# Patient Record
Sex: FEMALE | Race: WHITE | Employment: FULL TIME | URBAN - METROPOLITAN AREA
[De-identification: names, ages, dates, MRNs, and addresses within clinical notes are randomized per-mention and may not be internally consistent; named-entity substitution may affect disease eponyms.]

---

## 2019-11-07 ENCOUNTER — OFFICE VISIT (OUTPATIENT)
Dept: NEUROLOGY | Age: 64
End: 2019-11-07

## 2019-11-07 VITALS
HEIGHT: 64 IN | RESPIRATION RATE: 20 BRPM | TEMPERATURE: 97.9 F | WEIGHT: 158 LBS | BODY MASS INDEX: 26.98 KG/M2 | SYSTOLIC BLOOD PRESSURE: 124 MMHG | OXYGEN SATURATION: 96 % | HEART RATE: 78 BPM | DIASTOLIC BLOOD PRESSURE: 84 MMHG

## 2019-11-07 DIAGNOSIS — G50.0 TRIGEMINAL NEURALGIA: Primary | ICD-10-CM

## 2019-11-07 RX ORDER — AMLODIPINE BESYLATE 10 MG/1
TABLET ORAL
COMMUNITY
Start: 2019-09-20

## 2019-11-07 RX ORDER — GABAPENTIN 300 MG/1
CAPSULE ORAL
COMMUNITY
Start: 2019-09-20 | End: 2019-12-06 | Stop reason: ALTCHOICE

## 2019-11-07 RX ORDER — CARBAMAZEPINE 200 MG/1
200 TABLET, EXTENDED RELEASE ORAL 2 TIMES DAILY
Qty: 60 TAB | Refills: 5 | Status: SHIPPED | OUTPATIENT
Start: 2019-11-07 | End: 2019-12-06 | Stop reason: SINTOL

## 2019-11-07 RX ORDER — CELECOXIB 100 MG/1
CAPSULE ORAL
COMMUNITY
Start: 2019-08-15

## 2019-11-07 NOTE — PROGRESS NOTES
Alyssa Wolff is a 62 y.o., right handed female, with an established history of hypertension, who comes in with a 5 month history of left sided facial pain. She thought it was related to migraines that she had until she went through menopause at which time her headaches resolved. She now has a lancinating, lightening bolt type of pain in the left side of her face. It's centered in the jaw and around the ear. Also a slight bit at the edge of there nose. It's especially bad at night. She's also noted pain in the teeth intermittently especially at night. No problems on the right side. Neurontin had helped, but it's effectiveness has worn off. Brushing her teeth can trigger it. No weakness of the face or extremities noted. Social History; she's , lives with her . Doesn't smoke, occasional glass of wine. No illicit drugs. Works doing reception work. Family History; mother  from heart attack, had diabetes and hypertension. Father  from lung cancer, had diabetes. Siblings with diabetes. Current Outpatient Medications   Medication Sig Dispense Refill    gabapentin (NEURONTIN) 300 mg capsule       amLODIPine (NORVASC) 10 mg tablet       celecoxib (CELEBREX) 100 mg capsule          No past medical history on file. No past surgical history on file. Allergies   Allergen Reactions    Ondansetron Hcl Nausea Only    Oxycodone-Acetaminophen Other (comments)     Severe headache    Lisinopril Cough       There is no problem list on file for this patient.         Review of Systems:   As above otherwise 11 point review of systems negative including;   Constitutional no fever or chills  Skin denies rash or itching  HENT  Denies tinnitus, hearing lose  Eyes denies diplopia vision lose  Respiratory denies shortness of breath  Cardiovascular denies chest pain, dyspnea on exertion  Gastrointestinal denies nausea, vomiting, diarrhea, constipation  Genitourinary denies incontinence  Musculoskeletal denies joint pain or swelling  Endocrine denies weight change  Hematology denies easy bruising or bleeding   Neurological as above in HPI      PHYSICAL EXAMINATION:      VITAL SIGNS:    Visit Vitals  /84 (BP 1 Location: Left arm, BP Patient Position: Sitting)   Pulse 78   Temp 97.9 °F (36.6 °C) (Oral)   Resp 20   Ht 5' 4\" (1.626 m)   Wt 71.7 kg (158 lb)   LMP  (LMP Unknown)   SpO2 96%   BMI 27.12 kg/m²       GENERAL: The patient is well developed, well nourished, and in no apparent distress. EXTREMITIES: No clubbing, cyanosis, or edema is identified. Pulses 2+ and symmetrical.  Muscle tone is normal.  HEAD:   Ear, nose, and throat appear to be without trauma. The patient is normocephalic. NEUROLOGIC EXAMINATION    MENTAL STATUS: The patient is awake, alert, and oriented x 4. Fund of knowledge is adequate. Speech is fluent and memory appears to be intact, both long and short term. CRANIAL NERVES: II - Visual fields are full to confrontation. Funduscopic examination reveals flat disks bilaterally. Pupils are both 4 mm and briskly reactive to light and accommodation. III, IV, VI - Extraocular movements are intact and there is no nystagmus. V - Facial sensation is intact to pinprick and light touch. VII - Face is symmetrical.   VIII - Hearing is present. IX, X, XII- Palate rises symmetrically. Gag is present. Tongue is in the midline. XI - Shoulder shrugging and head turning intact  MOTOR:  The patient is 5/5 in all four limbs without any drift. Fine finger movements are symmetrical.  Isolated motor group testing reveals no focal abnormalities. Tone is normal.  Sensory examination is intact to pinprick, light touch and position sense testing. Reflexes are 2+ and symmetrical. Plantars are down going. Cerebellar examination reveals no gross ataxia or dysmetria. Gait is normal and the patient can tandem walk without any difficulty.       CBC: No results found for: WBC, RBC, HGB, HCT, PLT, HGBEXT, HCTEXT, PLTEXT  BMP: No results found for: GLU, NA, K, CL, CO2, BUN, CREA, CA  CMP: No results found for: GLU, NA, K, CL, CO2, BUN, CREA, CA, AGAP, BUCR, TBIL, GPT, AP, TP, ALB, GLOB, AGRAT  Coagulation: No results found for: PTP, INR, APTT, PTTT, INREXT  Cardiac markers: No results found for: CPK, CKND1, BHAVYA       Impression: Pretty classic trigeminal neuralgia of the left face in this patient who has risk factors including none. She has a normal examination. Plan: At this juncture we will switch her from a Neurontin and try some Tegretol which I find to be very effective for this condition. Reassured her and we will see her back here in a couple of weeks. PLEASE NOTE:   This document has been produced using voice recognition software. Unrecognized errors in transcription may be present.

## 2019-12-06 ENCOUNTER — OFFICE VISIT (OUTPATIENT)
Dept: NEUROLOGY | Age: 64
End: 2019-12-06

## 2019-12-06 VITALS
OXYGEN SATURATION: 95 % | DIASTOLIC BLOOD PRESSURE: 88 MMHG | WEIGHT: 157.2 LBS | HEIGHT: 64 IN | TEMPERATURE: 97.7 F | HEART RATE: 80 BPM | SYSTOLIC BLOOD PRESSURE: 142 MMHG | BODY MASS INDEX: 26.84 KG/M2 | RESPIRATION RATE: 18 BRPM

## 2019-12-06 DIAGNOSIS — G50.0 TRIGEMINAL NEURALGIA: Primary | ICD-10-CM

## 2019-12-06 RX ORDER — OXCARBAZEPINE 150 MG/1
150 TABLET, FILM COATED ORAL 2 TIMES DAILY
Qty: 60 TAB | Refills: 3 | Status: SHIPPED | OUTPATIENT
Start: 2019-12-06 | End: 2020-01-03 | Stop reason: ALTCHOICE

## 2019-12-06 NOTE — PROGRESS NOTES
Baljit Ugarte is a 62 y.o. female in today for follow-up on trigeminal neuralgia and concerns of medication. Spouse present at visit. 1. Have you been to the ER, urgent care clinic since your last visit? Hospitalized since your last visit? No    2. Have you seen or consulted any other health care providers outside of the [address] [place] [address] since your last visit? Include any pap smears or colon screening.  No

## 2019-12-06 NOTE — PROGRESS NOTES
Re:  Rebekah Sheaprd up visit     12/6/2019 11:15 AM    SSN: [id number]    Subjective:   Manuel Davis returns for follow up. The Tegretol was poorly tolerated. It did seem to help to some degree. Medications:    Current Outpatient Medications   Medication Sig Dispense Refill    gabapentin (NEURONTIN) 300 mg capsule       amLODIPine (NORVASC) 10 mg tablet       celecoxib (CELEBREX) 100 mg capsule       carBAMazepine XR (TEGRETOL XR) 200 mg SR tablet Take 1 Tab by mouth two (2) times a day. Indications: Facial Nerve Pain 60 Tab 5       Vital signs:    Visit Vitals  /88 (BP 1 Location: Left arm, BP Patient Position: Sitting)   Pulse 80   Temp 97.7 °F (36.5 °C) (Oral)   Resp 18   Ht 5' 4\" (1.626 m)   Wt 71.3 kg (157 lb 3.2 oz)   LMP  (LMP Unknown)   SpO2 95%   BMI 26.98 kg/m²       Review of Systems:   As above otherwise 11 point review of systems negative including;   Constitutional no fever or chills  Skin denies rash or itching  HEENT  Denies tinnitus, hearing lose  Eyes denies diplopia vision lose  Respiratory denies sortness of breath  Cardiovascular denies chest pain, dyspnea on exertion  Gastrointestinal denies nausea, vomiting, diarrhea, constipation  Genitourinary denies incontinence  Musculoskeletal denies joint pain or swelling  Endocrine denies weight change  Hematology denies easy bruising or bleeding   Neurological as above in HPI      There is no problem list on file for this patient. Objective: The patient is awake, alert, and oriented x 4. Fund of knowledge is adequate. Speech is fluent and memory is intact. Cranial Nerves: II  Visual fields are full to confrontation. III, IV, VI  Extraocular movements are intact. There is no nystagmus. V  Facial sensation is intact to pinprick. VII  Face is symmetrical.  VIII - Hearing is present. IX, X, XII  Palate is symmetrical.   XI - Shoulder shrugging and head turning intact  Motor:   The patient moves all four limbs fairly well and symmetrically. Tone is normal. Reflexes are 2+ and symmetrical. Plantars are down going. Gait is normal.    CBC: No results found for: WBC, RBC, HGB, HCT, PLT, HGBEXT, HCTEXT, PLTEXT  BMP: No results found for: GLU, NA, K, CL, CO2, BUN, CREA, CA  CMP: No results found for: GLU, NA, K, CL, CO2, BUN, CREA, CA, AGAP, BUCR, TBIL, GPT, AP, TP, ALB, GLOB, AGRAT  Coagulation: No results found for: PTP, INR, APTT, PTTT, INREXT  Cardiac markers: No results found for: CPK, CKND1, BHAVYA    Assessment:  Trigeminal neuralgia, side effects with Tegretol. Plan: Will try Trileptal at this time, RTC 4 weeks. Sincerely,        Hunter Ferreira.  Eleanor Sheldon M.D.

## 2020-01-03 ENCOUNTER — OFFICE VISIT (OUTPATIENT)
Dept: NEUROLOGY | Age: 65
End: 2020-01-03

## 2020-01-03 ENCOUNTER — HOSPITAL ENCOUNTER (OUTPATIENT)
Dept: LAB | Age: 65
Discharge: HOME | End: 2020-01-03
Payer: OTHER GOVERNMENT

## 2020-01-03 VITALS
DIASTOLIC BLOOD PRESSURE: 92 MMHG | BODY MASS INDEX: 27.28 KG/M2 | TEMPERATURE: 98.3 F | OXYGEN SATURATION: 96 % | HEIGHT: 64 IN | SYSTOLIC BLOOD PRESSURE: 130 MMHG | HEART RATE: 83 BPM | WEIGHT: 159.8 LBS | RESPIRATION RATE: 18 BRPM

## 2020-01-03 DIAGNOSIS — G50.0 TRIGEMINAL NEURALGIA: ICD-10-CM

## 2020-01-03 DIAGNOSIS — Z51.81 ENCOUNTER FOR THERAPEUTIC DRUG MONITORING: ICD-10-CM

## 2020-01-03 DIAGNOSIS — Z51.81 THERAPEUTIC DRUG MONITORING: Primary | ICD-10-CM

## 2020-01-03 LAB
BASOPHILS # BLD: 0 K/UL (ref 0–0.1)
BASOPHILS NFR BLD: 1 % (ref 0–2)
CARBAMAZEPINE SERPL-MCNC: 7.5 UG/ML (ref 4–12)
DIFFERENTIAL METHOD BLD: NORMAL
EOSINOPHIL # BLD: 0.3 K/UL (ref 0–0.4)
EOSINOPHIL NFR BLD: 4 % (ref 0–5)
ERYTHROCYTE [DISTWIDTH] IN BLOOD BY AUTOMATED COUNT: 13.1 % (ref 11.6–14.5)
HCT VFR BLD AUTO: 41.5 % (ref 35–45)
HGB BLD-MCNC: 14 G/DL (ref 12–16)
LYMPHOCYTES # BLD: 1.6 K/UL (ref 0.9–3.6)
LYMPHOCYTES NFR BLD: 25 % (ref 21–52)
MCH RBC QN AUTO: 30.2 PG (ref 24–34)
MCHC RBC AUTO-ENTMCNC: 33.7 G/DL (ref 31–37)
MCV RBC AUTO: 89.6 FL (ref 74–97)
MONOCYTES # BLD: 0.6 K/UL (ref 0.05–1.2)
MONOCYTES NFR BLD: 10 % (ref 3–10)
NEUTS SEG # BLD: 4 K/UL (ref 1.8–8)
NEUTS SEG NFR BLD: 60 % (ref 40–73)
PLATELET # BLD AUTO: 277 K/UL (ref 135–420)
PMV BLD AUTO: 10 FL (ref 9.2–11.8)
RBC # BLD AUTO: 4.63 M/UL (ref 4.2–5.3)
WBC # BLD AUTO: 6.5 K/UL (ref 4.6–13.2)

## 2020-01-03 PROCEDURE — 80156 ASSAY CARBAMAZEPINE TOTAL: CPT

## 2020-01-03 PROCEDURE — 85025 COMPLETE CBC W/AUTO DIFF WBC: CPT

## 2020-01-03 PROCEDURE — 36415 COLL VENOUS BLD VENIPUNCTURE: CPT

## 2020-01-03 RX ORDER — CARBAMAZEPINE 200 MG/1
200 TABLET, EXTENDED RELEASE ORAL 2 TIMES DAILY
Qty: 180 TAB | Refills: 3 | Status: SHIPPED | OUTPATIENT
Start: 2020-01-03 | End: 2020-12-02 | Stop reason: SDUPTHER

## 2020-01-03 NOTE — PROGRESS NOTES
Heather Goodrich is a 62 y.o. female in today for follow-up on trigeminal neuralgia. Spouse present at visit. 1. Have you been to the ER, urgent care clinic since your last visit? Hospitalized since your last visit? No    2. Have you seen or consulted any other health care providers outside of the [address] [place] [address] since your last visit? Include any pap smears or colon screening.  No

## 2020-01-03 NOTE — PROGRESS NOTES
Re:  Artist Kehr up visit     1/3/2020 10:04 AM    SSN: [id number]    Subjective:   Jessica Sat returns for follow up. The Tegretol was poorly tolerated. Trileptal didn't help, if anything it was worse. She restarted the Tegretol and was able to tolerate it. It's working, with just a little sensitivity when she touches her face in the wrong location. Medications:    Current Outpatient Medications   Medication Sig Dispense Refill    OXcarbazepine (TRILEPTAL) 150 mg tablet Take 1 Tab by mouth two (2) times a day. Indications: Facial Nerve Pain 60 Tab 3    amLODIPine (NORVASC) 10 mg tablet       celecoxib (CELEBREX) 100 mg capsule          Vital signs:    Visit Vitals  BP (!) 130/92 (BP 1 Location: Left arm, BP Patient Position: Sitting)   Pulse 83   Temp 98.3 °F (36.8 °C) (Oral)   Resp 18   Ht 5' 4\" (1.626 m)   Wt 72.5 kg (159 lb 12.8 oz)   LMP  (LMP Unknown)   SpO2 96%   BMI 27.43 kg/m²       Review of Systems:   As above otherwise 11 point review of systems negative including;   Constitutional no fever or chills  Skin denies rash or itching  HEENT  Denies tinnitus, hearing lose  Eyes denies diplopia vision lose  Respiratory denies sortness of breath  Cardiovascular denies chest pain, dyspnea on exertion  Gastrointestinal denies nausea, vomiting, diarrhea, constipation  Genitourinary denies incontinence  Musculoskeletal denies joint pain or swelling  Endocrine denies weight change  Hematology denies easy bruising or bleeding   Neurological as above in HPI      There is no problem list on file for this patient. Objective: The patient is awake, alert, and oriented x 4. Fund of knowledge is adequate. Speech is fluent and memory is intact. Cranial Nerves: II  Visual fields are full to confrontation. III, IV, VI  Extraocular movements are intact. There is no nystagmus. V  Facial sensation is intact to pinprick. VII  Face is symmetrical.  VIII - Hearing is present. IX, X, XII  Palate is symmetrical.   XI - Shoulder shrugging and head turning intact  Motor: The patient moves all four limbs fairly well and symmetrically. Tone is normal. Reflexes are 2+ and symmetrical. Plantars are down going. Gait is normal.    CBC: No results found for: WBC, RBC, HGB, HCT, PLT, HGBEXT, HCTEXT, PLTEXT  BMP: No results found for: GLU, NA, K, CL, CO2, BUN, CREA, CA  CMP: No results found for: GLU, NA, K, CL, CO2, BUN, CREA, CA, AGAP, BUCR, TBIL, GPT, AP, TP, ALB, GLOB, AGRAT  Coagulation: No results found for: PTP, INR, APTT, PTTT, INREXT  Cardiac markers: No results found for: CPK, CKND1, BHAVYA    Assessment:  Trigeminal neuralgia, side effects with Tegretol, better tolerated with second attempt. Plan:  Continue current dose of Tegretol. check levels. RTC  3 months. Sincerely,        Mina John.  Anjana Espinosa M.D.

## 2020-04-03 ENCOUNTER — VIRTUAL VISIT (OUTPATIENT)
Dept: NEUROLOGY | Age: 65
End: 2020-04-03

## 2020-04-03 VITALS — HEIGHT: 64 IN | BODY MASS INDEX: 26.98 KG/M2 | WEIGHT: 158 LBS

## 2020-04-03 DIAGNOSIS — G50.0 TRIGEMINAL NEURALGIA: Primary | ICD-10-CM

## 2020-04-03 DIAGNOSIS — Z51.81 THERAPEUTIC DRUG MONITORING: ICD-10-CM

## 2020-04-03 NOTE — PROGRESS NOTES
Juana Sanchez is a 62 y.o. female on virtual visit for follow-up for trigeminal neuralgia. 1. Have you been to the ER, urgent care clinic since your last visit? Hospitalized since your last visit? No    2. Have you seen or consulted any other health care providers outside of the [address] [place] [address] since your last visit? Include any pap smears or colon screening.  No

## 2020-04-03 NOTE — PROGRESS NOTES
Re:  Jennifer Zarate up visit     4/3/2020 9:56 AM    SSN: [id number]    Subjective:   Wild Osorio returns for follow up. The Tegretol was poorly tolerated. Trileptal didn't help, if anything it was worse. She restarted the Tegretol and was able to tolerate it. It's working, with just a little sensitivity when she touches her face in the wrong location. She's been on the Tegretol XR now for 4 months with great results. Unfortunately, about 2 weeks ago she developed a rash on her upper chest and shins. It's red raised and itchy. Medications:    Current Outpatient Medications   Medication Sig Dispense Refill    carBAMazepine XR (TEGRETOL XR) 200 mg SR tablet Take 1 Tab by mouth two (2) times a day. Indications: Facial Nerve Pain 180 Tab 3    amLODIPine (NORVASC) 10 mg tablet       celecoxib (CELEBREX) 100 mg capsule          Vital signs:    Visit Vitals  Ht 5' 4\" (1.626 m)   Wt 71.7 kg (158 lb)   LMP  (LMP Unknown)   BMI 27.12 kg/m²       Review of Systems:   As above otherwise 11 point review of systems negative including;   Constitutional no fever or chills  Skin denies rash or itching  HEENT  Denies tinnitus, hearing lose  Eyes denies diplopia vision lose  Respiratory denies sortness of breath  Cardiovascular denies chest pain, dyspnea on exertion  Gastrointestinal denies nausea, vomiting, diarrhea, constipation  Genitourinary denies incontinence  Musculoskeletal denies joint pain or swelling  Endocrine denies weight change  Hematology denies easy bruising or bleeding   Neurological as above in HPI      There is no problem list on file for this patient. Objective: The patient is awake, alert, and oriented x 4. Fund of knowledge is adequate. Speech is fluent and memory is intact. Cranial Nerves: II  Visual fields are full to confrontation. III, IV, VI  Extraocular movements are intact. There is no nystagmus. V  Facial sensation is intact to pinprick.   VII  Face is symmetrical.  VIII - Hearing is present. IX, X, XII  Palate is symmetrical.   XI - Shoulder shrugging and head turning intact  Motor: The patient moves all four limbs fairly well and symmetrically. Tone is normal. Reflexes are 2+ and symmetrical. Plantars are down going. Gait is normal.    CBC:   Lab Results   Component Value Date/Time    WBC 6.5 01/03/2020 10:48 AM    RBC 4.63 01/03/2020 10:48 AM    HGB 14.0 01/03/2020 10:48 AM    HCT 41.5 01/03/2020 10:48 AM    PLATELET 122 94/79/1026 10:48 AM     BMP: No results found for: GLU, NA, K, CL, CO2, BUN, CREA, CA  CMP: No results found for: GLU, NA, K, CL, CO2, BUN, CREA, CA, AGAP, BUCR, TBIL, GPT, AP, TP, ALB, GLOB, AGRAT  Coagulation: No results found for: PTP, INR, APTT, PTTT, INREXT  Cardiac markers: No results found for: CPK, CKND1, BHAVYA    Assessment:  Trigeminal neuralgia, side effects with Tegretol, better tolerated with second attempt. Now with rash possibly as a consequence of the Tegretol. Plan:  Stop Tegretol for now. Treat rash topically. Sincerely,        Michelle Argueta. Marquis Yvonne M.D. Consent: Yazan Leon, who was seen by synchronous (real-time) audio-video technology, and/or her healthcare decision maker, is aware that this patient-initiated, Telehealth encounter on 4/3/2020 is a billable service, with coverage as determined by her insurance carrier. She is aware that she may receive a bill and has provided verbal consent to proceed: Yes. I spent at least 15 minutes with this established patient, and >50% of the time was spent counseling and/or coordinating care regarding trigeminal neuralgia and possible medication side effects. [id number]  Subjective:   Yazan Leon is a 62 y.o. female who was seen for Facial Pain (trigeminal neuralgia)        Yazan Leon is a 62 y.o. female being evaluated by a video visit encounter for concerns as above. A caregiver was present when appropriate.  Due to this being a TeleHealth encounter (During FUFTW-41 public health emergency), evaluation of the following organ systems was limited: Vitals/Constitutional/EENT/Resp/CV/GI//MS/Neuro/Skin/Heme-Lymph-Imm. Pursuant to the emergency declaration under the [facility], [facility] waiver authority and the [employer] and [employer] Act, this Virtual  Visit was conducted, with patient's (and/or legal guardian's) consent, to reduce the patient's risk of exposure to COVID-19 and provide necessary medical care. Services were provided through a video synchronous discussion virtually to substitute for in-person clinic visit. Patient and provider were located at their individual homes.         Sharon Barajas MD

## 2020-05-06 ENCOUNTER — VIRTUAL VISIT (OUTPATIENT)
Dept: NEUROLOGY | Age: 65
End: 2020-05-06

## 2020-05-06 VITALS — BODY MASS INDEX: 26.8 KG/M2 | WEIGHT: 157 LBS | HEIGHT: 64 IN

## 2020-05-06 DIAGNOSIS — G50.0 TRIGEMINAL NEURALGIA: Primary | ICD-10-CM

## 2020-05-06 NOTE — PROGRESS NOTES
Chief Complaint   Patient presents with    Facial Pain     follow up       Benjamin Sinha presents today for   Chief Complaint   Patient presents with    Facial Pain     follow up       Is someone accompanying this pt? Virtual visit [phone]    Is the patient using any DME equipment during OV? no    Depression Screening:  No flowsheet data found. Learning Assessment:  No flowsheet data found. Abuse Screening:  No flowsheet data found. Fall Risk  No flowsheet data found. Coordination of Care:  1. Have you been to the ER, urgent care clinic since your last visit? Hospitalized since your last visit? no    2. Have you seen or consulted any other health care providers outside of the [address] [place] [address] since your last visit? Include any pap smears or colon screening.  Yes, [facility]

## 2020-05-06 NOTE — PROGRESS NOTES
Re:  Aaliyah Ruiz up visit     5/6/2020 8:58 AM    SSN: [id number]    Subjective:   Quicny Giles returns for follow up. The Tegretol was poorly tolerated. Trileptal didn't help, if anything it was worse. She restarted the Tegretol and was able to tolerate it. It's working, with just a little sensitivity when she touches her face in the wrong location. She's been on the Tegretol XR now for 4 months with great results. Unfortunately, about 2 weeks ago she developed a rash on her upper chest and shins. It's red raised and itchy. She stopped the Tegretol and the facial pain came back. She restarted the Tegretol at a lower dose, which works well for the facial pain, but continues to have a slight rash on her upper chest.     Medications:    Current Outpatient Medications   Medication Sig Dispense Refill    carBAMazepine XR (TEGRETOL XR) 200 mg SR tablet Take 1 Tab by mouth two (2) times a day. Indications: Facial Nerve Pain 180 Tab 3    amLODIPine (NORVASC) 10 mg tablet       celecoxib (CELEBREX) 100 mg capsule          Vital signs:    Visit Vitals  Ht 5' 4\" (1.626 m)   Wt 71.2 kg (157 lb)   LMP  (LMP Unknown)   BMI 26.95 kg/m²       Review of Systems:   As above otherwise 11 point review of systems negative including;   Constitutional no fever or chills  Skin denies rash or itching  HEENT  Denies tinnitus, hearing lose  Eyes denies diplopia vision lose  Respiratory denies sortness of breath  Cardiovascular denies chest pain, dyspnea on exertion  Gastrointestinal denies nausea, vomiting, diarrhea, constipation  Genitourinary denies incontinence  Musculoskeletal denies joint pain or swelling  Endocrine denies weight change  Hematology denies easy bruising or bleeding   Neurological as above in HPI      There is no problem list on file for this patient. Objective: The patient is awake, alert, and oriented x 4. Fund of knowledge is adequate.   Speech is fluent and memory is intact. Cranial Nerves: II  Visual fields are full to confrontation. III, IV, VI  Extraocular movements are intact. There is no nystagmus. V  Facial sensation is intact to pinprick. VII  Face is symmetrical.  VIII - Hearing is present. IX, X, XII  Palate is symmetrical.   XI - Shoulder shrugging and head turning intact  Motor: The patient moves all four limbs fairly well and symmetrically. Tone is normal. Reflexes are 2+ and symmetrical. Plantars are down going. Gait is normal.    CBC:   Lab Results   Component Value Date/Time    WBC 6.5 01/03/2020 10:48 AM    RBC 4.63 01/03/2020 10:48 AM    HGB 14.0 01/03/2020 10:48 AM    HCT 41.5 01/03/2020 10:48 AM    PLATELET 166 82/11/5508 10:48 AM     BMP: No results found for: GLU, NA, K, CL, CO2, BUN, CREA, CA  CMP: No results found for: GLU, NA, K, CL, CO2, BUN, CREA, CA, AGAP, BUCR, TBIL, GPT, AP, TP, ALB, GLOB, AGRAT  Coagulation: No results found for: PTP, INR, APTT, PTTT, INREXT  Cardiac markers: No results found for: CPK, CKND1, BHAVYA    Assessment:  Trigeminal neuralgia, side effects with Tegretol, better tolerated with second attempt. Now with rash possibly as a consequence of the Tegretol. Plan:  Continue low dose Tegretol XR. Treat rash topically. RTC in 6 months. Sincerely,        Michelle Argueta. Marquis Yvonne M.D. Consent: Yazan Leon, who was seen by synchronous (real-time) audio-video technology, and/or her healthcare decision maker, is aware that this patient-initiated, Telehealth encounter on 5/6/2020 is a billable service, with coverage as determined by her insurance carrier. She is aware that she may receive a bill and has provided verbal consent to proceed: Yes. I spent at least 15 minutes with this established patient, and >50% of the time was spent counseling and/or coordinating care regarding trigeminal neuralgia and possible medication side effects.   712  Subjective:   Yazan Leon is a 62 y.o. female who was seen for Facial Pain (follow up)        Yazan Leon is a 62 y.o. female being evaluated by a video visit encounter for concerns as above. A caregiver was present when appropriate. Due to this being a TeleHealth encounter (During [facility] public health emergency), evaluation of the following organ systems was limited: Vitals/Constitutional/EENT/Resp/CV/GI//MS/Neuro/Skin/Heme-Lymph-Imm. Pursuant to the emergency declaration under the [facility] waiver authority and the [employer] and [employer] Act, this Virtual  Visit was conducted, with patient's (and/or legal guardian's) consent, to reduce the patient's risk of exposure to COVID-19 and provide necessary medical care. Services were provided through a video synchronous discussion virtually to substitute for in-person clinic visit. Patient and provider were located at their individual homes.         Pedro Pablo Blackwood MD

## 2020-05-06 NOTE — PATIENT INSTRUCTIONS
Thank you for choosing [employer] [facility] for your  
 
care. You may receive a survey about your visit. We appreciate you taking time  
 
to complete this survey as we use your feedback to improve our services. We  
 
realize we are not perfect, but we strive to provide excellent care.

## 2020-12-02 ENCOUNTER — TELEPHONE (OUTPATIENT)
Dept: NEUROLOGY | Age: 65
End: 2020-12-02

## 2020-12-02 DIAGNOSIS — G50.0 TRIGEMINAL NEURALGIA: ICD-10-CM

## 2020-12-02 RX ORDER — CARBAMAZEPINE 200 MG/1
200 TABLET, EXTENDED RELEASE ORAL 2 TIMES DAILY
Qty: 180 TAB | Refills: 3 | Status: SHIPPED | OUTPATIENT
Start: 2020-12-02

## 2020-12-02 NOTE — TELEPHONE ENCOUNTER
Pt requests a temporary refill for Tegretol until her appt on 1/8 with Dr. Chuyita Moncada. She runs out of meds on 12/10.